# Patient Record
Sex: MALE | Race: ASIAN | Employment: STUDENT | URBAN - METROPOLITAN AREA
[De-identification: names, ages, dates, MRNs, and addresses within clinical notes are randomized per-mention and may not be internally consistent; named-entity substitution may affect disease eponyms.]

---

## 2024-03-21 ENCOUNTER — HOSPITAL ENCOUNTER (EMERGENCY)
Facility: HOSPITAL | Age: 24
Discharge: HOME | End: 2024-03-21
Attending: EMERGENCY MEDICINE
Payer: COMMERCIAL

## 2024-03-21 ENCOUNTER — APPOINTMENT (OUTPATIENT)
Dept: GENERAL RADIOLOGY | Facility: HOSPITAL | Age: 24
End: 2024-03-21
Payer: COMMERCIAL

## 2024-03-21 VITALS
HEART RATE: 92 BPM | DIASTOLIC BLOOD PRESSURE: 86 MMHG | SYSTOLIC BLOOD PRESSURE: 115 MMHG | WEIGHT: 170 LBS | OXYGEN SATURATION: 97 % | TEMPERATURE: 98 F | RESPIRATION RATE: 16 BRPM

## 2024-03-21 DIAGNOSIS — S82.55XA CLOSED NONDISPLACED FRACTURE OF MEDIAL MALLEOLUS OF LEFT TIBIA, INITIAL ENCOUNTER: Primary | ICD-10-CM

## 2024-03-21 PROCEDURE — 73610 X-RAY EXAM OF ANKLE: CPT

## 2024-03-21 PROCEDURE — 99284 EMERGENCY DEPT VISIT MOD MDM: CPT

## 2024-03-21 PROCEDURE — 29515 APPLICATION SHORT LEG SPLINT: CPT

## 2024-03-22 NOTE — ED INITIAL ASSESSMENT (HPI)
Patient was playing soccer, someone stepped on left ankle. Gray a \"pop\". Has not tired to ambulate.

## 2024-03-22 NOTE — ED PROVIDER NOTES
Patient Seen in: [facility] Emergency Department      History     Chief Complaint   Patient presents with    Foot Injury     Stated Complaint: Left foot injury sustained while playing soccer    Subjective:   HPI    23-year-old male here with left ankle pain.  Happened when he was playing soccer.  Someone stepped on the left ankle.  Complains of pain.  Pain with standing and ambulation .  No paresthesias.  No proximal tib-fib pain.  No other associated symptoms.  No other complaints.    Objective:   History reviewed. No pertinent past medical history.           No pertinent past surgical history.              No pertinent social history.            Review of Systems    Positive for stated complaint: Left foot injury sustained while playing soccer  Other systems are as noted in HPI.  Constitutional and vital signs reviewed.      All other systems reviewed and negative except as noted above.    Physical Exam     ED Triage Vitals [03/21/24 2147]   /86   Pulse 92   Resp 16   Temp 98.4 °F (36.9 °C)   Temp src Temporal   SpO2 97 %   O2 Device None (Room air)       Current:/86   Pulse 92   Temp 98.4 °F (36.9 °C) (Temporal)   Resp 16   Wt 77.1 kg   SpO2 97%         Physical Exam  Vitals and nursing note reviewed.   Cardiovascular:      Pulses: Normal pulses.   Musculoskeletal:         General: Swelling and tenderness present.      Comments: Bimalleolar tenderness and swelling.  No evidence of Achilles rupture.  Foot is nontender.  Neurovascular intact    No Proximal tib-fib tenderness   Neurological:      Sensory: No sensory deficit.      Motor: No weakness.              ED Course   Labs Reviewed - No data to display                    MDM            -Pulse oximetry was interpreted by me and was normal.  Pulse oximeter was ordered to monitor patient for hypoxia.        -History source other than patient - partner        -Comorbidities did add complexity to the management are mentioned in the HPI  above           -DDX: Includes but not limited to -        -I personally reviewed the radiographs findings and they show- avulsion fx medial malleolus  XR ANKLE (MIN 3 VIEWS), LEFT (CPT=73610)   Final Result   PROCEDURE:  XR ANKLE (MIN 3 VIEWS), LEFT (CPT=73610)       TECHNIQUE:  Three views were obtained.       COMPARISON:  None.       INDICATIONS:  Left foot injury sustained while playing soccer       PATIENT STATED HISTORY: (As transcribed by Technologist)  Patient injured    his left ankle in soccer today. Patient offered no additional history at    this time.             FINDINGS:  Ankle soft tissue swelling is noted.  There is a possible    avulsion injury at the inferior margin of the medial malleolus.     Alternatively, this could represent a chronic injury.  Clinical    correlation for point tenderness in this region is    suggested.                         =====   CONCLUSION:  See above.           LOCATION:  [facility]           Dictated by (CST): Stromberg, LeRoy, MD on 3/21/2024 at 10:46 PM        Finalized by (CST): Stromberg, LeRoy, MD on 3/21/2024 at 10:49 PM              Please refer to radiology report for official interpretation         The patient's splint was checked after placement and was noted to have good placement.  Distal circulation and neurovascular exam was noted to be intact.      Patient did nonweightbearing following up with orthopedics.  Discharged home stable condition.                                     Medical Decision Making      Disposition and Plan     Clinical Impression:  1. Closed nondisplaced fracture of medial malleolus of left tibia, initial encounter         Disposition:  Discharge  3/21/2024 11:02 pm    Follow-up:  Jackie Herman MD  [address] [place] [id number] [phone]    Schedule an appointment as soon as possible for a visit            Medications Prescribed:  There are no discharge medications for this patient.